# Patient Record
Sex: MALE | Race: WHITE | Employment: FULL TIME | URBAN - METROPOLITAN AREA
[De-identification: names, ages, dates, MRNs, and addresses within clinical notes are randomized per-mention and may not be internally consistent; named-entity substitution may affect disease eponyms.]

---

## 2018-09-28 PROCEDURE — 84403 ASSAY OF TOTAL TESTOSTERONE: CPT | Performed by: INTERNAL MEDICINE

## 2018-09-28 PROCEDURE — 84402 ASSAY OF FREE TESTOSTERONE: CPT | Performed by: INTERNAL MEDICINE

## 2021-01-02 ENCOUNTER — APPOINTMENT (OUTPATIENT)
Dept: GENERAL RADIOLOGY | Facility: HOSPITAL | Age: 46
End: 2021-01-02
Attending: EMERGENCY MEDICINE
Payer: COMMERCIAL

## 2021-01-02 PROCEDURE — 71045 X-RAY EXAM CHEST 1 VIEW: CPT | Performed by: EMERGENCY MEDICINE

## 2021-01-02 NOTE — ED PROVIDER NOTES
Patient Seen in: [facility] Emergency Department      History   Patient presents with:  Headache  Fatigue    Stated Complaint: headache, tired, lightheaded.  reports hx of anxiety \"felt episode of chest pain*    HPI/Subjective:   HPI    Patient is 4 Temp 97.7 °F (36.5 °C) (Temporal)   Resp 18   Ht 185.4 cm (6' 1\")   Wt 115.7 kg   SpO2 98%   BMI 33.64 kg/m²         Physical Exam  Vitals signs and nursing note reviewed. Constitutional:       General: He is not in acute distress.      Appearance: He i EKG    Rate, intervals and axes as noted on EKG Report.   Rate: 78  Rhythm: Sinus Rhythm  Reading: normal ekg             XR CHEST AP PORTABLE  (CPT=71045)   Final Result    PROCEDURE:  XR CHEST AP PORTABLE  (CPT=71045)         TECHNIQUE:  AP chest Prescribed:  Discharge Medication List as of 1/2/2021  7:05 PM

## 2021-01-02 NOTE — ED INITIAL ASSESSMENT (HPI)
Reports headache and bilateral tingly to hands and feet. Reports intermittent dizziness. Reports took a walk approx 45 min pta and felt slight chest and head tightness, which is resolved.

## 2021-03-19 PROBLEM — U07.1 2019 NOVEL CORONAVIRUS DETECTED: Status: ACTIVE | Noted: 2021-02-11
